# Patient Record
Sex: MALE | Race: WHITE | NOT HISPANIC OR LATINO | URBAN - NONMETROPOLITAN AREA
[De-identification: names, ages, dates, MRNs, and addresses within clinical notes are randomized per-mention and may not be internally consistent; named-entity substitution may affect disease eponyms.]

---

## 2022-12-05 ENCOUNTER — OFFICE VISIT (OUTPATIENT)
Dept: SLEEP MEDICINE | Facility: HOSPITAL | Age: 37
End: 2022-12-05

## 2022-12-05 VITALS
DIASTOLIC BLOOD PRESSURE: 78 MMHG | WEIGHT: 315 LBS | HEART RATE: 68 BPM | OXYGEN SATURATION: 95 % | BODY MASS INDEX: 44.1 KG/M2 | SYSTOLIC BLOOD PRESSURE: 122 MMHG | HEIGHT: 71 IN

## 2022-12-05 DIAGNOSIS — G47.00 FREQUENT NOCTURNAL AWAKENING: ICD-10-CM

## 2022-12-05 DIAGNOSIS — G47.19 EXCESSIVE DAYTIME SLEEPINESS: ICD-10-CM

## 2022-12-05 DIAGNOSIS — G47.33 OBSTRUCTIVE SLEEP APNEA: Primary | ICD-10-CM

## 2022-12-05 DIAGNOSIS — R06.83 SNORING: ICD-10-CM

## 2022-12-05 DIAGNOSIS — E66.01 MORBID OBESITY: ICD-10-CM

## 2022-12-05 PROCEDURE — 99203 OFFICE O/P NEW LOW 30 MIN: CPT | Performed by: NURSE PRACTITIONER

## 2022-12-05 RX ORDER — METOPROLOL SUCCINATE 100 MG/1
100 TABLET, EXTENDED RELEASE ORAL DAILY
COMMUNITY
Start: 2022-10-24

## 2022-12-05 RX ORDER — DICLOFENAC SODIUM 75 MG/1
75 TABLET, DELAYED RELEASE ORAL 2 TIMES DAILY
COMMUNITY
Start: 2022-10-24

## 2022-12-05 RX ORDER — PANTOPRAZOLE SODIUM 40 MG/1
40 TABLET, DELAYED RELEASE ORAL DAILY
COMMUNITY
Start: 2022-10-24

## 2022-12-05 RX ORDER — FLUOXETINE HYDROCHLORIDE 40 MG/1
40 CAPSULE ORAL DAILY
COMMUNITY
Start: 2022-10-24

## 2022-12-05 RX ORDER — GAUZE BANDAGE 2" X 2"
1 BANDAGE TOPICAL DAILY
COMMUNITY
Start: 2022-10-24

## 2022-12-05 RX ORDER — HYDROCHLOROTHIAZIDE 25 MG/1
25 TABLET ORAL DAILY
COMMUNITY
Start: 2022-10-24

## 2022-12-05 NOTE — PATIENT INSTRUCTIONS
-Get repeat home sleep study x 1 night without CPAP  -Monika will call with home study results and will send orders for new machine and supplies to [facility] (\[facility]"" in [place], behind [facility])  -If you haven't heard from [facility] within ~4 weeks after getting your study results, call us or reach out to them

## 2022-12-05 NOTE — PROGRESS NOTES
New Patient Sleep Medicine Consultation    Encounter Date: 12/5/2022         Patient's Primary Care Provider: Shalini Wilson APRN  Referring Provider: No ref. provider found  Reason for consultation/chief complaint: snoring, awakening gasping for breath, excessive daytime sleepiness, unrefreshing sleep and insomnia    Graham Ro is a 37 y.o. male who admits to snoring, unrestful sleep, high blood pressure, excessive daytime sleepiness, stopping breathing during sleep, disturbed or restless sleep, sleep talking, difficulty falling asleep, difficulty staying asleep and taking medicine to help go to sleep.    He denies cataplexy, sleep paralysis, or hypnagogic hallucinations. His bedtime is ~ 2200. He  falls asleep after 60 minutes, and is up 2-3 times per night. He wakes up ~ 0530. He endorses 6-7 hours of sleep. He drinks 1 cups of coffee, 0 teas, and 0 sodas per day. He drinks 0 alcoholic beverages per week. He is not a current smoker. He does not take sedatives or hypnotics. He has no sleepiness with driving. He does not nap.     Patient recently moved here from [place] and had initial sleep testing at [facility] ~10 years ago (patient states that practice is no longer in business and he would not be able to obtain records).   He has been on CPAP since his initial OPAL diagnosis. He is currently in need of new supplies and needing a new machine. His current machine is malfunctioning.    Lake - 12  [place]ake Sleepiness Scale  Sitting and reading: Moderate chance of dozing  Watching TV: Slight chance of dozing  Sitting, inactive in a public place (e.g. a theatre or a meeting): Slight chance of dozing  As a passenger in a car for an hour without a break: High chance of dozing  Lying down to rest in the afternoon when circumstances permit: Moderate chance of dozing  Sitting and talking to someone: Slight chance of dozing  Sitting quietly after a lunch without alcohol: Slight chance of  dozing  In a car, while stopped for a few minutes in traffic: Slight chance of dozing  Total score: 12    Prior Sleep Testin. HST ~10 years ago, AHI of ?  2. Currently on 8-20 cm H2O    PAP Data:    Time frame: 2021-2022   Compliance 95%  Average use on days used: 8 hrs 34 min  Percent of days with usage greater than or equal to 4 hours: 89%  PAP range : 8-20 cm H2O  Average 90% pressure: 8.7 cmH2O  Leak: 13.3 L/minute  Average AHI: 0.5 events/hr  Mask type: Full face mask  DME: establishing at [facility]  Machine type: ResMed AirSense 10      Past Medical History:   Diagnosis Date   • Alcohol abuse    • Allergic rhinitis    • Back pain    • Diabetes mellitus (HCC)    • Hypertension    • Neuropathy      Social History     Socioeconomic History   • Marital status: Unknown     Family History   Problem Relation Age of Onset   • Hypertension Mother    • Hypertension Father    • Diabetes Father    • Diabetes Brother    • Hypertension Brother      Prior T&A, UPPP, maxillofacial, or bariatric surgery: None  Family history of sleep disorders: Mother - OPAL on CPAP; brothers - OPAL on CPAP  Other family history + for: As above  Occupation: Teacher in [employer]  Marital status:   Children: 2  Has 2 brothers and 0 sisters  Smoking history: smoked never    Review of Systems:  Constitutional: positive for fatigue  Ears, nose, mouth, throat, and face: positive for snoring  Respiratory: negative  Cardiovascular: negative  Gastrointestinal: negative  Genitourinary:negative  Musculoskeletal:negative  Neurological: negative  Behavioral/Psych: positive for sleep disturbance  Patient advised to discuss any positive ROS with PCP.      Vitals:    22 0846   BP: 122/78   Pulse: 68   SpO2: 95%           22  0846   Weight: (!) 150 kg (331 lb)       Body mass index is 46.17 kg/m². Class 3 Severe Obesity (BMI >=40). Obesity-related health conditions include the following: obstructive sleep apnea,  "hypertension and diabetes mellitus. Obesity is newly identified. BMI is is above average; BMI management plan is completed. I recommend portion control and increasing exercise.    Tobacco Use: Not on file       Physical Exam:        General: Alert. Cooperative. Well developed. No acute distress.   Head/Neck:  Normocephalic. Symmetrical. Atraumatic.     Neck circumference: 19.5\"             Eyes: Sclera clear. No icterus. PERRLA. Normal EOM.             Ears: No deformities. Normal hearing.             Nose: No septal deviation. No drainage.          Throat: No oral lesions. No thrush. Moist mucous membranes. Trachea midline.    Tongue is normal     Dentition is fair       Pharynx: Posterior pharyngeal pillars are narrow    Mallampati score of II (hard and soft palate, upper portion of tonsils anduvula visible)    Pharynx is nonerythematous, with both tonsils mildly enlarged   Chest Wall:  Normal shape. Symmetric expansion with respiration. No tenderness.          Lungs:  Clear to auscultation bilaterally. No wheezes. No rhonchi. No rales. Respirations regular, even and unlabored.            Heart:  Regular rhythm and normal rate. Normal S1 and S2. No murmur.     Abdomen:  Soft, non-tender and non-distended. Normal bowel sounds. No masses.  Extremities:  Moves all extremities well. No edema.           Pulses: Pulses palpable and equal bilaterally.               Skin: Dry. Intact. No bleeding. No rash.           Neuro: Moves all 4 extremities and cranial nerves grossly intact.  Psychiatric: Normal mood and affect.      Current Outpatient Medications:   •  diclofenac (VOLTAREN) 75 MG EC tablet, Take 75 mg by mouth 2 (Two) Times a Day., Disp: , Rfl:   •  FLUoxetine (PROzac) 40 MG capsule, Take 40 mg by mouth Daily., Disp: , Rfl:   •  hydroCHLOROthiazide (HYDRODIURIL) 25 MG tablet, Take 25 mg by mouth Daily., Disp: , Rfl:   •  metFORMIN (GLUCOPHAGE) 500 MG tablet, Take 500 mg by mouth 2 (Two) Times a Day With Meals., " Disp: , Rfl:   •  metoprolol succinate XL (TOPROL-XL) 100 MG 24 hr tablet, Take 100 mg by mouth Daily., Disp: , Rfl:   •  pantoprazole (PROTONIX) 40 MG EC tablet, Take 40 mg by mouth Daily., Disp: , Rfl:   •  Thiamine Mononitrate 100 MG tablet, Take 1 tablet by mouth Daily., Disp: , Rfl:     No results found for: WBC, RBC, HGB, HCT, MCV, MCH, MCHC, RDW, RDWSD, MPV, PLT, NEUTRORELPCT, LYMPHORELPCT, MONORELPCT, EOSRELPCT, BASORELPCT, AUTOIGPER, NEUTROABS, LYMPHSABS, MONOSABS, EOSABS, BASOSABS, AUTOIGNUM, NRBC, IRON, FERRITIN  No results found for: GLUCOSE, BUN, CREATININE, EGFRIFNONA, EGFRIFAFRI, BCR, K, CO2, CALCIUM, PROTENTOTREF, ALBUMIN, LABIL2, BILIRUBIN, AST, ALT    Contraindications to home sleep test: none    ASSESSMENT:  1. Excessive daytime sleepiness, history of obstructive sleep apnea - New (to me), additional work-up planned (4)  1. Check requalifying home sleep study (regular)  2. Call with results  3. Pertinent labs were reviewed as listed above  4. Once results are received, send new orders for new CPAP @ 8-20 cm H2O to [facility]  5. Follow up in 30-90 days after receiving new CPAP machine, or sooner if trouble with PAP adaptation  2. Snoring, presumed obstructive sleep apnea - New (to me), additional work-up planned (4)  1. As above  3. Frequent nocturnal awakenings - New (to me), additional work-up planned (4)  1. As above  4. Morbid obesity - BMI 46.2 - stable chronic illness      I spent 30 minutes caring for Graham on this date of service. This time includes time spent by me in the following activities: preparing for the visit, reviewing tests, obtaining and/or reviewing a separately obtained history, performing a medically appropriate examination and/or evaluation , counseling and educating the patient/family/caregiver, ordering medications, tests, or procedures, documenting information in the medical record and care coordination; discussing PAP therapy, PAP compliance, PAP maintenance and Further  testing    Patient to follow up in 31-90 days with compliance report. Patient agrees to return sooner if changes in symptoms.           This document has been electronically signed by TAWANA Taylor on December 5, 2022 09:31 CST          CC: Shalini Wilson APRN          No ref. provider found

## 2022-12-12 ENCOUNTER — HOSPITAL ENCOUNTER (OUTPATIENT)
Dept: SLEEP MEDICINE | Facility: HOSPITAL | Age: 37
Discharge: HOME | End: 2022-12-12
Admitting: NURSE PRACTITIONER

## 2022-12-12 DIAGNOSIS — R06.83 SNORING: ICD-10-CM

## 2022-12-12 DIAGNOSIS — G47.00 FREQUENT NOCTURNAL AWAKENING: ICD-10-CM

## 2022-12-12 DIAGNOSIS — E66.01 MORBID OBESITY: ICD-10-CM

## 2022-12-12 DIAGNOSIS — G47.33 OBSTRUCTIVE SLEEP APNEA: ICD-10-CM

## 2022-12-12 DIAGNOSIS — G47.19 EXCESSIVE DAYTIME SLEEPINESS: ICD-10-CM

## 2022-12-12 PROCEDURE — 95800 SLP STDY UNATTENDED: CPT

## 2022-12-12 PROCEDURE — 95800 SLP STDY UNATTENDED: CPT | Performed by: PSYCHIATRY & NEUROLOGY

## 2022-12-29 DIAGNOSIS — G47.33 OBSTRUCTIVE SLEEP APNEA: Primary | ICD-10-CM

## 2022-12-29 NOTE — PROGRESS NOTES
Patient underwent repeat home sleep testing to re-qualify for PAP therapy to get a new machine.     HST on 12/12/2022, AHI of 24, RDI of 35, O2 basilia of 83% with no sustained hypoxia.    Will send orders for new autoCPAP 8-20 cm H2O with mask per patient choice to [facility].   Patient has a follow up appointment in 30-90 days.

## 2023-01-03 ENCOUNTER — TELEPHONE (OUTPATIENT)
Dept: SLEEP MEDICINE | Facility: HOSPITAL | Age: 38
End: 2023-01-03
Payer: COMMERCIAL

## 2023-01-03 NOTE — TELEPHONE ENCOUNTER
----- Message from TAWANA Taylor sent at 1/3/2023  8:27 AM CST -----  He was one I told you last week I called and couldn't get ahold of because he lives at [employer] so just to let him know that he still qualified for CPAP (he was just a re-qualifying home study).  I put orders in for [facility] and looks like he has an appointment for next month that may need to be pushed out a little.      ----- Message -----  From: Faith Little  Sent: 1/3/2023   8:02 AM CST  To: TAWANA Taylor    Patient left a vm stating he was suppose to call for results

## 2023-03-27 ENCOUNTER — OFFICE VISIT (OUTPATIENT)
Dept: SLEEP MEDICINE | Facility: HOSPITAL | Age: 38
End: 2023-03-27
Payer: COMMERCIAL

## 2023-03-27 VITALS
OXYGEN SATURATION: 96 % | BODY MASS INDEX: 41.86 KG/M2 | HEIGHT: 71 IN | HEART RATE: 80 BPM | SYSTOLIC BLOOD PRESSURE: 119 MMHG | DIASTOLIC BLOOD PRESSURE: 71 MMHG | WEIGHT: 299 LBS

## 2023-03-27 DIAGNOSIS — E66.01 MORBID OBESITY: ICD-10-CM

## 2023-03-27 DIAGNOSIS — G47.33 OBSTRUCTIVE SLEEP APNEA: Primary | ICD-10-CM

## 2023-03-27 DIAGNOSIS — G47.52 RBD (REM BEHAVIORAL DISORDER): ICD-10-CM

## 2023-03-27 PROCEDURE — 99213 OFFICE O/P EST LOW 20 MIN: CPT | Performed by: NURSE PRACTITIONER

## 2023-03-27 PROCEDURE — 1159F MED LIST DOCD IN RCRD: CPT | Performed by: NURSE PRACTITIONER

## 2023-03-27 PROCEDURE — 1160F RVW MEDS BY RX/DR IN RCRD: CPT | Performed by: NURSE PRACTITIONER

## 2023-03-27 RX ORDER — GABAPENTIN 100 MG/1
CAPSULE ORAL
COMMUNITY
Start: 2023-03-15

## 2023-03-27 RX ORDER — NALTREXONE HYDROCHLORIDE 50 MG/1
1 TABLET, FILM COATED ORAL DAILY
COMMUNITY
Start: 2023-03-07

## 2023-03-27 RX ORDER — LISINOPRIL 5 MG/1
1 TABLET ORAL DAILY
COMMUNITY
Start: 2023-03-07

## 2023-03-27 RX ORDER — CHOLECALCIFEROL (VITAMIN D3) 125 MCG
5 CAPSULE ORAL NIGHTLY
Qty: 30 TABLET | Refills: 11 | Status: SHIPPED | OUTPATIENT
Start: 2023-03-27

## 2023-03-27 RX ORDER — ROSUVASTATIN CALCIUM 5 MG/1
1 TABLET, COATED ORAL DAILY
COMMUNITY
Start: 2023-03-07

## 2023-03-27 RX ORDER — SEMAGLUTIDE 1.34 MG/ML
INJECTION, SOLUTION SUBCUTANEOUS
COMMUNITY
Start: 2023-03-07

## 2023-03-27 NOTE — PROGRESS NOTES
Sleep Clinic Follow Up    Date: 3/27/2023  Primary Care Provider: Shalini Wilson APRN    Last office visit: 2022 (I reviewed this note)    CC: Follow up: OPAL re-started on CPAP      Interim History:  Since the last visit:    1) moderate OPAL -  Graham Ro has remained compliant with CPAP. He denies mask and machine issues, dry mouth, headaches, or pressures intolerance. He denies sleep paralysis, nasal congestion, URI sx. Since re-starting PAP therapy, patient reports more refreshing sleep and less daytime sleepiness.  Patient reports that he is having episodes of acting out and yelling in his sleep and multiple episodes of falling out of the bed. He has not taken anything for this. This has been going on for many years, with or without CPAP.    2) Patient denies RLS symptoms.     Sleep Testin. HST on 2022, AHI of 24   2. Currently on 8-20 cm H2O    PAP Data:    Time frame: 2023-2023   Compliance: 81.3%  Average use on days used: 7 hrs 54 min  Percent of days with usage greater than or equal to 4 hours: 80%  PAP range: 5-15 cm H2O  Average 90% pressure: 5.9 cmH2O  Leak: 4 minutes  Average AHI: 0.1 events/hr  Mask type: Full face mask - modified  DME: [facility]  Machine type: 3B [employer] III    Bed time: 2300  Sleep latency: 15 minutes  Number of times awakens during the night: 2  Wake time: 0530  Estimated total sleep time at night: 6 hours  Caffeine intake: 1 cups of coffee, 0 cups of tea, and 0 sodas per day  Alcohol intake: 0 drinks per week  Nap time: none/rare   Sleepiness with Driving: none     [place] - 5 (previously 12)  [place] Sleepiness Scale  Sitting and reading: Slight chance of dozing  Watching TV: Would never doze  Sitting, inactive in a public place (e.g. a theatre or a meeting): Would never doze  As a passenger in a car for an hour without a break: Moderate chance of dozing  Lying down to rest in the afternoon when circumstances permit: Slight chance of  dozing  Sitting and talking to someone: Would never doze  Sitting quietly after a lunch without alcohol: Slight chance of dozing  In a car, while stopped for a few minutes in traffic: Would never doze  Total score: 5    PMHx, FH, SH reviewed and pertinent changes are: Reportedly unchanged from last office visit with us.      Review of Systems:   Negative for chest pain, SOA, fever, chills, cough, N/V/D, abdominal pain.    Smoking:none    Graham Ro  has no history on file for tobacco use.    Physical Exam:  Vitals:    03/27/23 0835   BP: 119/71   Pulse: 80   SpO2: 96%           03/27/23  0835   Weight: 136 kg (299 lb)     Body mass index is 41.72 kg/m². Class 3 Severe Obesity (BMI >=40). Obesity-related health conditions include the following: obstructive sleep apnea and hypertension. Obesity is unchanged. BMI is is above average; BMI management plan is completed. I recommend portion control and increasing exercise.    Gen:                No distress, conversant, pleasant, appears stated age, alert, oriented  Eyes:               Anicteric sclera, moist conjunctiva, no lid lag                           PERRL, EOMI   Heent:             NC/AT                          Oropharynx clear                          Normal hearing  Lungs:             Normal effort, non-labored breathing  CV:                  Normal S1/S2                          No lower extremity edema  ABD:               Soft, rounded, non-distended                Psych:             Appropriate affect  Neuro:             CN 2-12 appear intact    Past Medical History:   Diagnosis Date   • Alcohol abuse    • Allergic rhinitis    • Back pain    • Diabetes mellitus (HCC)    • Hypertension    • Neuropathy        Current Outpatient Medications:   •  diclofenac (VOLTAREN) 75 MG EC tablet, Take 1 tablet by mouth 2 (Two) Times a Day., Disp: , Rfl:   •  FLUoxetine (PROzac) 40 MG capsule, Take 1 capsule by mouth Daily., Disp: , Rfl:   •  gabapentin (NEURONTIN) 100 MG  capsule, TAKE 1 CAPSULE BY MOUTH AT BEDTIME FOR DIABETIC NEUROPATHY, Disp: , Rfl:   •  hydroCHLOROthiazide (HYDRODIURIL) 25 MG tablet, Take 1 tablet by mouth Daily., Disp: , Rfl:   •  lisinopril (PRINIVIL,ZESTRIL) 5 MG tablet, Take 1 tablet by mouth Daily., Disp: , Rfl:   •  metFORMIN (GLUCOPHAGE) 500 MG tablet, Take 1 tablet by mouth 2 (Two) Times a Day With Meals., Disp: , Rfl:   •  metoprolol succinate XL (TOPROL-XL) 100 MG 24 hr tablet, Take 1 tablet by mouth Daily., Disp: , Rfl:   •  naltrexone (DEPADE) 50 MG tablet, Take 1 tablet by mouth Daily., Disp: , Rfl:   •  Ozempic, 1 MG/DOSE, 4 MG/3ML solution pen-injector, INJECT 1MG SUBCUTANEOUSLY WEEKLY, Disp: , Rfl:   •  pantoprazole (PROTONIX) 40 MG EC tablet, Take 1 tablet by mouth Daily., Disp: , Rfl:   •  rosuvastatin (CRESTOR) 5 MG tablet, Take 1 tablet by mouth Daily., Disp: , Rfl:   •  Thiamine Mononitrate 100 MG tablet, Take 1 tablet by mouth Daily., Disp: , Rfl:     No results found for: WBC, RBC, HGB, HCT, MCV, MCH, MCHC, RDW, RDWSD, MPV, PLT, NEUTRORELPCT, LYMPHORELPCT, MONORELPCT, EOSRELPCT, BASORELPCT, AUTOIGPER, NEUTROABS, LYMPHSABS, MONOSABS, EOSABS, BASOSABS, AUTOIGNUM, NRBC    No results found for: GLUCOSE, BUN, CREATININE, EGFRRESULT, EGFR, BCR, K, CO2, CALCIUM, PROTENTOTREF, ALBUMIN, BILITOT, AST, ALT    Assessment and Plan:    1. Obstructive sleep apnea - Established, stable (1)  1. Compliant with PAP therapy  2. Continue PAP as prescribed  3. Script for PAP supplies  4. Pertinent labs were reviewed as listed above  5. Return to clinic in 1 year with compliance report unless change in symptoms in interim period  2. REM behavior disorder - New (to me), no additional work-up planned (3)   1. Melatonin 5 mg nightly  2. Follow up sooner if needed  3. Morbid obesity - BMI 41.7 - stable chronic illness      I spent 20 minutes caring for Graham on this date of service. This time includes time spent by me in the following activities: preparing for the  visit, reviewing tests, obtaining and/or reviewing a separately obtained history, performing a medically appropriate examination and/or evaluation , counseling and educating the patient/family/caregiver, ordering medications, tests, or procedures, documenting information in the medical record and care coordination; discussing PAP therapy, PAP compliance, PAP maintenance and Medication changes    RTC in 12 months. Patient agrees to return sooner if changes in symptoms.        This document has been electronically signed by TAWANA Taylor on March 27, 2023 08:45 CDT          CC: Shalini Wilson APRN          No ref. provider found